# Patient Record
(demographics unavailable — no encounter records)

---

## 2025-01-31 NOTE — CONSULT LETTER
[Dear  ___] : Dear  [unfilled], [Consult Letter:] : I had the pleasure of evaluating your patient, [unfilled]. [Please see my note below.] : Please see my note below. [Consult Closing:] : Thank you very much for allowing me to participate in the care of this patient.  If you have any questions, please do not hesitate to contact me. [Sincerely,] : Sincerely, [FreeTextEntry2] : Heriberto Jamil MD (ref/pcp) [FreeTextEntry3] : Tyree Hoang MD, MPH System Director of Thoracic Surgery Director of Comprehensive Lung and Foregut [place] Professor Cardiovascular & Thoracic Surgery  [facility] at [facility]

## 2025-01-31 NOTE — HISTORY OF PRESENT ILLNESS
[FreeTextEntry1] : Mr. AUGUST DELCID, 70 year old male, former smoker, w/ hx of DMII, HLD, who presented with increased in size lung nodule. Referred by Dr. Bro.   CT chest on 1/08/2025 (MSR):  - Mild emphysematous changes are present within both lungs.  - 1.4 cm Subsolid nodule within the anterior aspect of the right upper lobe is not significantly changed since 9/26/2024 but has been slowly increasing in size since 2022. There is a solid component centrally which measures 0.5 cm (series 4 image 102). - Mild centrilobular nodularity is present within the lingula in both upper lobes, suggestive of mild respiratory bronchiolitis.  - Inflammatory changes within the lingula have mildly progressed since 9/26/2024, with mild associated bronchiectasis. - Stable 0.6 cm nodule in the left upper lobe is unchanged since 12/22/2022, unchanged. - Moderate diffuse bronchial wall thickening is present. - 1.6 cm hypodensity in the left liver is unchanged.  - Cholelithiasis is present.  Pt presents today for CT Sx consultation. Pt reports asymptomatic, denies fever, chills, SOB, cough, hemoptysis, CP, pain or recent illness.

## 2025-01-31 NOTE — HISTORY OF PRESENT ILLNESS
[FreeTextEntry1] : Mr. AUGUST DLECID, 70 year old male, former smoker, w/ hx of DMII, HLD, who presented with increased in size lung nodule. Referred by Dr. Bro.   CT chest on 1/08/2025 (MSR):  - Mild emphysematous changes are present within both lungs.  - 1.4 cm Subsolid nodule within the anterior aspect of the right upper lobe is not significantly changed since 9/26/2024 but has been slowly increasing in size since 2022. There is a solid component centrally which measures 0.5 cm (series 4 image 102). - Mild centrilobular nodularity is present within the lingula in both upper lobes, suggestive of mild respiratory bronchiolitis.  - Inflammatory changes within the lingula have mildly progressed since 9/26/2024, with mild associated bronchiectasis. - Stable 0.6 cm nodule in the left upper lobe is unchanged since 12/22/2022, unchanged. - Moderate diffuse bronchial wall thickening is present. - 1.6 cm hypodensity in the left liver is unchanged.  - Cholelithiasis is present.  Pt presents today for CT Sx consultation. Pt reports asymptomatic, denies fever, chills, SOB, cough, hemoptysis, CP, pain or recent illness.

## 2025-01-31 NOTE — ASSESSMENT
[FreeTextEntry1] : Mr. AUGUST DELCID, 70 year old male, former smoker, w/ hx of DMII, HLD, who presented with increased in size lung nodule. Referred by Dr. Bro.   CT chest on 1/08/2025 (MSR):  - Mild emphysematous changes are present within both lungs.  - 1.4 cm Subsolid nodule within the anterior aspect of the right upper lobe is not significantly changed since 9/26/2024 but has been slowly increasing in size since 2022. There is a solid component centrally which measures 0.5 cm (series 4 image 102). - Mild centrilobular nodularity is present within the lingula in both upper lobes, suggestive of mild respiratory bronchiolitis.  - Inflammatory changes within the lingula have mildly progressed since 9/26/2024, with mild associated bronchiectasis. - Stable 0.6 cm nodule in the left upper lobe is unchanged since 12/22/2022, unchanged. - Moderate diffuse bronchial wall thickening is present. - 1.6 cm hypodensity in the left liver is unchanged.  - Cholelithiasis is present.  I have reviewed the patient's medical records and diagnostic images at time of this office consultation and have made the following recommendation: 1. CT reviewed with pt, increased in size lung nodule, suspicious for malignancy. Therefore, I recommended Rt VATS, wedge rxn of RUL, MLND on 2/19/25. Risks of surgery includes but not limited to pain, infection, bleeding, injuries to surrounding structures, and need for further procedure, stroke or death. Benefits and alternatives explained to patient, all questions answered, patient agreed to proceed with surgery. 2. PFTs  3. PET/CT 4. Medical clearance and PST    I, EMILY Javed, personally performed the evaluation and management (E/M) services for this new patient.  That E/M includes conducting the initial examination, assessing all conditions, and establishing the plan of care.  Today, my ACP, Carie Jackson, SEDRICK was here to observe my evaluation and management services for this patient to be followed going forward.

## 2025-01-31 NOTE — CONSULT LETTER
[Dear  ___] : Dear  [unfilled], [Consult Letter:] : I had the pleasure of evaluating your patient, [unfilled]. [Please see my note below.] : Please see my note below. [Consult Closing:] : Thank you very much for allowing me to participate in the care of this patient.  If you have any questions, please do not hesitate to contact me. [Sincerely,] : Sincerely, [FreeTextEntry2] : Heriberto Jamil MD (ref/pcp) [FreeTextEntry3] : Tyree Hoang MD, MPH System Director of Thoracic Surgery Director of Comprehensive Lung and Foregut [place] Professor Cardiovascular & Thoracic Surgery  [facility] [employer] [facility] at [facility]

## 2025-03-06 NOTE — DISCUSSION/SUMMARY
[Doing Well] : is doing well [Excellent Pain Control] : has excellent pain control [No Sign of Infection] : is showing no signs of infection [0] : 0 [Remove Sutures/Staples] : removed sutures/staples [Clinical Recheck] : clinical recheck

## 2025-03-06 NOTE — ASSESSMENT
[FreeTextEntry1] : Mr. AUGUST DELCID, 70 year old male, former smoker, w/ hx of DMII, HLD, who presented with increased in size lung nodule. Referred by Dr. Bro.  CT chest on 1/08/2025 (MSR): - Mild emphysematous changes are present within both lungs. - 1.4 cm Subsolid nodule within the anterior aspect of the right upper lobe is not significantly changed since 9/26/2024 but has been slowly increasing in size since 2022. There is a solid component centrally which measures 0.5 cm (series 4 image 102). - Mild centrilobular nodularity is present within the lingula in both upper lobes, suggestive of mild respiratory bronchiolitis. - Inflammatory changes within the lingula have mildly progressed since 9/26/2024, with mild associated bronchiectasis. - Stable 0.6 cm nodule in the left upper lobe is unchanged since 12/22/2022, unchanged. - Moderate diffuse bronchial wall thickening is present. - 1.6 cm hypodensity in the left liver is unchanged. - Cholelithiasis is present.  PFTS done on 2/18/25: FVC: 83%; FEV1: 58%; DLCO: 66%.  PET/CT done on 2/18/25. - BL thyroid lobe nodules; mild uptake noted within a 11mm Rt thyroid lobe nodule (SUV max 2.5, image 49) - Irregular subsolid 14mm RUL nodule (SUV max 1.2, image 60); mildly increased in size from 2022.  - Subsolid 5mm MARIO LN & mult. solid nodules within the MARIO measuring up to 4mm (image 70), which are without significant change from 1/8/25 CT, but new from 9/26/24 exam.  - 6mm MARIO LN, possibly inflammatory.  - Mild emphysematous changes & few subsegmental atelectasis &/or scarring within BL lungs - Mildly active nonenlarged BL hilar LN with SUB max 3.5 on both sides  Now s/p Rt VATS, R.A., RUL Wedge rxn, MLND on 2/19/25.  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 Patient reports feeling numbness sensation at surgical site, otherwise denies cough, SOB, pain or difficulty in breathing.  I have reviewed the patient's medical records and diagnostic images at time of this office consultation and have made the following recommendation: 1. I personally reviewed CXR images with the patient today and images demonstrated no evidence of concerning post-op complications. I reviewed the pathology report which showed early stage of RUL adenocarcinoma.  Systemic therapy not indicated. I recommended patient to follow up with me in 6 months with CT Chest w/ no contrast.  I, EMILY Javed, personally performed the evaluation and management (E/M) services for this established patient who presents today with (a) new problem(s)/exacerbation of (an) existing condition(s).  That E/M includes conducting the examination, assessing all new/exacerbated conditions, and establishing a new plan of care.  Today, my ACP, SEDRICK Ray was here to observe my evaluation and management services for this new problem/exacerbated condition to be followed going forward.

## 2025-03-06 NOTE — ASSESSMENT
[FreeTextEntry1] : Mr. AUGUST DELCID, 70 year old male, former smoker, w/ hx of DMII, HLD, who presented with increased in size lung nodule. Referred by Dr. Bro.  CT chest on 1/08/2025 (MSR): - Mild emphysematous changes are present within both lungs. - 1.4 cm Subsolid nodule within the anterior aspect of the right upper lobe is not significantly changed since 9/26/2024 but has been slowly increasing in size since 2022. There is a solid component centrally which measures 0.5 cm (series 4 image 102). - Mild centrilobular nodularity is present within the lingula in both upper lobes, suggestive of mild respiratory bronchiolitis. - Inflammatory changes within the lingula have mildly progressed since 9/26/2024, with mild associated bronchiectasis. - Stable 0.6 cm nodule in the left upper lobe is unchanged since 12/22/2022, unchanged. - Moderate diffuse bronchial wall thickening is present. - 1.6 cm hypodensity in the left liver is unchanged. - Cholelithiasis is present.  PFTS done on 2/18/25: FVC: 83%; FEV1: 58%; DLCO: 66%.  PET/CT done on 2/18/25. - BL thyroid lobe nodules; mild uptake noted within a 11mm Rt thyroid lobe nodule (SUV max 2.5, image 49) - Irregular subsolid 14mm RUL nodule (SUV max 1.2, image 60); mildly increased in size from 2022.  - Subsolid 5mm MARIO LN & mult. solid nodules within the MARIO measuring up to 4mm (image 70), which are without significant change from 1/8/25 CT, but new from 9/26/24 exam.  - 6mm MARIO LN, possibly inflammatory.  - Mild emphysematous changes & few subsegmental atelectasis &/or scarring within BL lungs - Mildly active nonenlarged BL hilar LN with SUB max 3.5 on both sides  Now s/p Rt VATS, R.A., RUL Wedge rxn, MLND on 2/19/25.  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 Patient reports feeling numbness sensation at surgical site, otherwise denies cough, SOB, pain or difficulty in breathing.  I have reviewed the patient's medical records and diagnostic images at time of this office consultation and have made the following recommendation: 1. I personally reviewed CXR images with the patient today and images demonstrated no evidence of concerning post-op complications. I reviewed the pathology report which showed early stage of RUL adenocarcinoma.  Systemic therapy not indicated. I recommended patient to follow up with me in 6 months with CT Chest w/ no contrast.  I, EMILY Javed, personally performed the evaluation and management (E/M) services for this established patient who presents today with (a) new problem(s)/exacerbation of (an) existing condition(s).  That E/M includes conducting the examination, assessing all new/exacerbated conditions, and establishing a new plan of care.  Today, my ACP, SEDRICK Ray was here to observe my evaluation and management services for this new problem/exacerbated condition to be followed going forward.

## 2025-03-06 NOTE — DISCUSSION/SUMMARY
Attempted to return pt call. VM left. [Doing Well] : is doing well [Excellent Pain Control] : has excellent pain control [No Sign of Infection] : is showing no signs of infection [0] : 0 [Remove Sutures/Staples] : removed sutures/staples [Clinical Recheck] : clinical recheck

## 2025-03-06 NOTE — DISCUSSION/SUMMARY
Requested Prescriptions     Pending Prescriptions Disp Refills   • losartan (COZAAR) 50 MG Tab [Pharmacy Med Name: LOSARTAN  50MG  TAB] 90 Tab 0     Sig: TAKE 1 TABLET BY MOUTH  DAILY   • amLODIPine (NORVASC) 5 MG Tab [Pharmacy Med Name: AMLODIPINE  5MG  TAB] 90 Tab 0     Sig: TAKE 1 TABLET BY MOUTH  DAILY   • atorvastatin (LIPITOR) 20 MG Tab [Pharmacy Med Name: ATORVASTATIN  20MG  TAB] 90 Tab 0     Sig: TAKE 1 TABLET BY MOUTH  DAILY   Lorie Kumar M.D.   [Doing Well] : is doing well [Excellent Pain Control] : has excellent pain control [No Sign of Infection] : is showing no signs of infection [0] : 0 [Remove Sutures/Staples] : removed sutures/staples [Clinical Recheck] : clinical recheck

## 2025-03-06 NOTE — CONSULT LETTER
[Dear  ___] : Dear  [unfilled], [Consult Letter:] : I had the pleasure of evaluating your patient, [unfilled]. [Please see my note below.] : Please see my note below. [Consult Closing:] : Thank you very much for allowing me to participate in the care of this patient.  If you have any questions, please do not hesitate to contact me. [Sincerely,] : Sincerely, [FreeTextEntry2] : Heriberto Jamil MD (ref/pcp) [FreeTextEntry3] : Tyree Hoang MD, MPH System Director of Thoracic Surgery Director of Comprehensive Lung and Foregut [place] Professor Cardiovascular & Thoracic Surgery  [facility] at [facility]

## 2025-03-06 NOTE — ASSESSMENT
[FreeTextEntry1] : Mr. AUGUST DELCID, 70 year old male, former smoker, w/ hx of DMII, HLD, who presented with increased in size lung nodule. Referred by Dr. Bro.  CT chest on 1/08/2025 (MSR): - Mild emphysematous changes are present within both lungs. - 1.4 cm Subsolid nodule within the anterior aspect of the right upper lobe is not significantly changed since 9/26/2024 but has been slowly increasing in size since 2022. There is a solid component centrally which measures 0.5 cm (series 4 image 102). - Mild centrilobular nodularity is present within the lingula in both upper lobes, suggestive of mild respiratory bronchiolitis. - Inflammatory changes within the lingula have mildly progressed since 9/26/2024, with mild associated bronchiectasis. - Stable 0.6 cm nodule in the left upper lobe is unchanged since 12/22/2022, unchanged. - Moderate diffuse bronchial wall thickening is present. - 1.6 cm hypodensity in the left liver is unchanged. - Cholelithiasis is present.  PFTS done on 2/18/25: FVC: 83%; FEV1: 58%; DLCO: 66%.  PET/CT done on 2/18/25. - BL thyroid lobe nodules; mild uptake noted within a 11mm Rt thyroid lobe nodule (SUV max 2.5, image 49) - Irregular subsolid 14mm RUL nodule (SUV max 1.2, image 60); mildly increased in size from 2022.  - Subsolid 5mm MARIO LN & mult. solid nodules within the MARIO measuring up to 4mm (image 70), which are without significant change from 1/8/25 CT, but new from 9/26/24 exam.  - 6mm MARIO LN, possibly inflammatory.  - Mild emphysematous changes & few subsegmental atelectasis &/or scarring within BL lungs - Mildly active nonenlarged BL hilar LN with SUB max 3.5 on both sides  Now s/p Rt VATS, R.A., RUL Wedge rxn, MLND on 2/19/25.  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 Patient reports feeling numbness sensation at surgical site, otherwise denies cough, SOB, pain or difficulty in breathing.  I have reviewed the patient's medical records and diagnostic images at time of this office consultation and have made the following recommendation: 1. I personally reviewed CXR images with the patient today and images demonstrated no evidence of concerning post-op complications. I reviewed the pathology report which showed early stage of RUL adenocarcinoma.  Systemic therapy not indicated. I recommended patient to follow up with me in 6 months with CT Chest w/ no contrast.  I, EMILY Javed, personally performed the evaluation and management (E/M) services for this established patient who presents today with (a) new problem(s)/exacerbation of (an) existing condition(s).  That E/M includes conducting the examination, assessing all new/exacerbated conditions, and establishing a new plan of care.  Today, my ACP, SEDRICK Ray was here to observe my evaluation and management services for this new problem/exacerbated condition to be followed going forward.

## 2025-03-06 NOTE — CONSULT LETTER
[Dear  ___] : Dear  [unfilled], [Consult Letter:] : I had the pleasure of evaluating your patient, [unfilled]. [Please see my note below.] : Please see my note below. [Consult Closing:] : Thank you very much for allowing me to participate in the care of this patient.  If you have any questions, please do not hesitate to contact me. [Sincerely,] : Sincerely, [FreeTextEntry2] : Heriberto Jamil MD (ref/pcp) [FreeTextEntry3] : Tyree Hoang MD, MPH System Director of Thoracic Surgery Director of Comprehensive Lung and Foregut [place] Professor Cardiovascular & Thoracic Surgery  [facility] at [facility]

## 2025-03-06 NOTE — ASSESSMENT
[FreeTextEntry1] : Mr. AUGUST DELCID, 70 year old male, former smoker, w/ hx of DMII, HLD, who presented with increased in size lung nodule. Referred by Dr. Bro.  CT chest on 1/08/2025 (MSR): - Mild emphysematous changes are present within both lungs. - 1.4 cm Subsolid nodule within the anterior aspect of the right upper lobe is not significantly changed since 9/26/2024 but has been slowly increasing in size since 2022. There is a solid component centrally which measures 0.5 cm (series 4 image 102). - Mild centrilobular nodularity is present within the lingula in both upper lobes, suggestive of mild respiratory bronchiolitis. - Inflammatory changes within the lingula have mildly progressed since 9/26/2024, with mild associated bronchiectasis. - Stable 0.6 cm nodule in the left upper lobe is unchanged since 12/22/2022, unchanged. - Moderate diffuse bronchial wall thickening is present. - 1.6 cm hypodensity in the left liver is unchanged. - Cholelithiasis is present.  PFTS done on 2/18/25: FVC: 83%; FEV1: 58%; DLCO: 66%.  PET/CT done on 2/18/25. - BL thyroid lobe nodules; mild uptake noted within a 11mm Rt thyroid lobe nodule (SUV max 2.5, image 49) - Irregular subsolid 14mm RUL nodule (SUV max 1.2, image 60); mildly increased in size from 2022.  - Subsolid 5mm MARIO LN & mult. solid nodules within the MARIO measuring up to 4mm (image 70), which are without significant change from 1/8/25 CT, but new from 9/26/24 exam.  - 6mm MARIO LN, possibly inflammatory.  - Mild emphysematous changes & few subsegmental atelectasis &/or scarring within BL lungs - Mildly active nonenlarged BL hilar LN with SUB max 3.5 on both sides  Now s/p Rt VATS, R.A., RUL Wedge rxn, MLND on 2/19/25.  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 Patient reports feeling numbness sensation at surgical site, otherwise denies cough, SOB, pain or difficulty in breathing.  I have reviewed the patient's medical records and diagnostic images at time of this office consultation and have made the following recommendation: 1. I personally reviewed CXR images with the patient today and images demonstrated no evidence of concerning post-op complications. I reviewed the pathology report which showed early stage of RUL adenocarcinoma.  Systemic therapy not indicated. I recommended patient to follow up with me in 6 months with CT Chest w/ no contrast.  I, EMILY Javed, personally performed the evaluation and management (E/M) services for this established patient who presents today with (a) new problem(s)/exacerbation of (an) existing condition(s).  That E/M includes conducting the examination, assessing all new/exacerbated conditions, and establishing a new plan of care.  Today, my ACP, SEDRICK Ray was here to observe my evaluation and management services for this new problem/exacerbated condition to be followed going forward.

## 2025-03-06 NOTE — CONSULT LETTER
[Dear  ___] : Dear  [unfilled], [Consult Letter:] : I had the pleasure of evaluating your patient, [unfilled]. [Please see my note below.] : Please see my note below. [Consult Closing:] : Thank you very much for allowing me to participate in the care of this patient.  If you have any questions, please do not hesitate to contact me. [Sincerely,] : Sincerely, [FreeTextEntry2] : Heriberto Jamil MD (ref/pcp) [FreeTextEntry3] : Tyree Hoang MD, MPH System Director of Thoracic Surgery Director of Comprehensive Lung and Foregut [place] Professor Cardiovascular & Thoracic Surgery  [facility] at [facility]

## 2025-03-06 NOTE — CONSULT LETTER
[Dear  ___] : Dear  [unfilled], [Consult Letter:] : I had the pleasure of evaluating your patient, [unfilled]. [Please see my note below.] : Please see my note below. [Consult Closing:] : Thank you very much for allowing me to participate in the care of this patient.  If you have any questions, please do not hesitate to contact me. [Sincerely,] : Sincerely, [FreeTextEntry2] : Heriberto Jamil MD (ref/pcp) [FreeTextEntry3] : Tyree Hoang MD, MPH System Director of Thoracic Surgery Director of Comprehensive Lung and Foregut [place] Professor Cardiovascular & Thoracic Surgery  [facility] at [facility]